# Patient Record
Sex: MALE | Race: WHITE | NOT HISPANIC OR LATINO | Employment: FULL TIME | URBAN - METROPOLITAN AREA
[De-identification: names, ages, dates, MRNs, and addresses within clinical notes are randomized per-mention and may not be internally consistent; named-entity substitution may affect disease eponyms.]

---

## 2019-04-02 ENCOUNTER — OFFICE VISIT (OUTPATIENT)
Dept: URGENT CARE | Facility: CLINIC | Age: 29
End: 2019-04-02

## 2019-04-02 VITALS
SYSTOLIC BLOOD PRESSURE: 121 MMHG | WEIGHT: 185 LBS | RESPIRATION RATE: 16 BRPM | BODY MASS INDEX: 25.9 KG/M2 | HEART RATE: 73 BPM | TEMPERATURE: 99 F | HEIGHT: 71 IN | DIASTOLIC BLOOD PRESSURE: 65 MMHG | OXYGEN SATURATION: 97 %

## 2019-04-02 DIAGNOSIS — J32.9 SINUSITIS, UNSPECIFIED CHRONICITY, UNSPECIFIED LOCATION: Primary | ICD-10-CM

## 2019-04-02 LAB
CTP QC/QA: YES
S PYO RRNA THROAT QL PROBE: NEGATIVE

## 2019-04-02 PROCEDURE — 87880 POCT RAPID STREP A: ICD-10-PCS | Mod: QW,S$GLB,, | Performed by: NURSE PRACTITIONER

## 2019-04-02 PROCEDURE — 87880 STREP A ASSAY W/OPTIC: CPT | Mod: QW,S$GLB,, | Performed by: NURSE PRACTITIONER

## 2019-04-02 PROCEDURE — 99214 OFFICE O/P EST MOD 30 MIN: CPT | Mod: S$GLB,,, | Performed by: NURSE PRACTITIONER

## 2019-04-02 PROCEDURE — 99214 PR OFFICE/OUTPT VISIT, EST, LEVL IV, 30-39 MIN: ICD-10-PCS | Mod: S$GLB,,, | Performed by: NURSE PRACTITIONER

## 2019-04-02 RX ORDER — AZITHROMYCIN 250 MG/1
TABLET, FILM COATED ORAL
Qty: 6 TABLET | Refills: 0 | Status: SHIPPED | OUTPATIENT
Start: 2019-04-02

## 2019-04-02 RX ORDER — BENZONATATE 100 MG/1
100 CAPSULE ORAL EVERY 6 HOURS PRN
Qty: 30 CAPSULE | Refills: 1 | Status: SHIPPED | OUTPATIENT
Start: 2019-04-02 | End: 2020-04-01

## 2019-04-02 NOTE — PATIENT INSTRUCTIONS
"Please follow up with your Primary care provider within 2-5 days if your signs and symptoms have not resolved or worsen.  The usual course of cold symptoms are 10-14 days.     If your condition worsens or fails to improve we recommend that you receive another evaluation at the emergency room immediately or contact your primary medical clinic to discuss your concerns.     You must understand that you have received an Urgent Care treatment only and that you may be released before all of your medical problems are known or treated.   You, the patient, will arrange for follow up care as instructed.     Tylenol or Ibuprofen can also be used as directed for pain/fever unless you have an allergy to them or medical condition such as stomach ulcers, kidney or liver disease or blood thinners etc for which you should not be taking these type of medications.     Take over the counter cough medication as directed as needed for cough.  You should avoid medications with pseudoephedrine or phenylephrine (any medication with "D") if you have high blood pressure as this can cause an elevation in your blood pressure. Instead consider Corcidin HBP as needed to prevent an elevated blood pressure.     Natural remedies of symptoms (as needed) include humidification, saline nasal sprays, and/or steamy showers.  Increase fluids, warm tea with honey, cough drops as needed.  You may also use salt water gargles for sore throat.    IF you received a steroid shot today - As discussed, this can elevate your blood pressure, elevate your blood sugar, water weight gain, nervous energy, redness to the face and dimpling of the skin at the injection site.     Sinusitis (Antibiotic Treatment)    The sinuses are air-filled spaces within the bones of the face. They connect to the inside of the nose. Sinusitis is an inflammation of the tissue lining the sinus cavity. Sinus inflammation can occur during a cold. It can also be due to allergies to pollens and " other particles in the air. Sinusitis can cause symptoms of sinus congestion and fullness. A sinus infection causes fever, headache and facial pain. There is often green or yellow drainage from the nose or into the back of the throat (post-nasal drip). You have been given antibiotics to treat this condition.  Home care:  · Take the full course of antibiotics as instructed. Do not stop taking them, even if you feel better.  · Drink plenty of water, hot tea, and other liquids. This may help thin mucus. It also may promote sinus drainage.  · Heat may help soothe painful areas of the face. Use a towel soaked in hot water. Or,  the shower and direct the hot spray onto your face. Using a vaporizer along with a menthol rub at night may also help.   · An expectorant containing guaifenesin may help thin the mucus and promote drainage from the sinuses.  · Over-the-counter decongestants may be used unless a similar medicine was prescribed. Nasal sprays work the fastest. Use one that contains phenylephrine or oxymetazoline. First blow the nose gently. Then use the spray. Do not use these medicines more often than directed on the label or symptoms may get worse. You may also use tablets containing pseudoephedrine. Avoid products that combine ingredients, because side effects may be increased. Read labels. You can also ask the pharmacist for help. (NOTE: Persons with high blood pressure should not use decongestants. They can raise blood pressure.)  · Over-the-counter antihistamines may help if allergies contributed to your sinusitis.    · Do not use nasal rinses or irrigation during an acute sinus infection, unless told to by your health care provider. Rinsing may spread the infection to other sinuses.  · Use acetaminophen or ibuprofen to control pain, unless another pain medicine was prescribed. (If you have chronic liver or kidney disease or ever had a stomach ulcer, talk with your doctor before using these medicines.  Aspirin should never be used in anyone under 18 years of age who is ill with a fever. It may cause severe liver damage.)  · Don't smoke. This can worsen symptoms.  Follow-up care  Follow up with your healthcare provider or our staff if you are not improving within the next week.  When to seek medical advice  Call your healthcare provider if any of these occur:  · Facial pain or headache becoming more severe  · Stiff neck  · Unusual drowsiness or confusion  · Swelling of the forehead or eyelids  · Vision problems, including blurred or double vision  · Fever of 100.4ºF (38ºC) or higher, or as directed by your healthcare provider  · Seizure  · Breathing problems  · Symptoms not resolving within 10 days  Date Last Reviewed: 4/13/2015  © 6491-6977 [employer]. [address] [place] [address], [place] [address]. All rights reserved. This information is not intended as a substitute for professional medical care. Always follow your healthcare professional's instructions.

## 2019-04-02 NOTE — PROGRESS NOTES
"azithSubjective:       Patient ID: Beck De La Cruz is a 28 y.o. male.    Vitals:  height is 5' 11" (1.803 m) and weight is 83.9 kg (185 lb). His oral temperature is 98.7 °F (37.1 °C). His blood pressure is 121/65 and his pulse is 73. His respiration is 16 and oxygen saturation is 97%.     Chief Complaint: Cough    Cough   This is a new problem. Episode onset: 4 days ago. The problem has been gradually worsening. The problem occurs constantly. The cough is productive of sputum. Associated symptoms include headaches, postnasal drip and shortness of breath. Pertinent negatives include no chest pain, chills, ear congestion, ear pain, eye redness, fever, heartburn, hemoptysis, myalgias, nasal congestion, rash, rhinorrhea, sore throat, sweats or wheezing. Nothing aggravates the symptoms. Treatments tried: mucinex, lana seltzer severe. The treatment provided mild relief. There is no history of asthma, bronchiectasis, bronchitis, COPD, emphysema or pneumonia.       Constitution: Negative for chills, sweating, fatigue and fever.   HENT: Positive for postnasal drip. Negative for ear pain, congestion, sinus pain, sinus pressure, sore throat and voice change.    Neck: Negative for painful lymph nodes.   Cardiovascular: Negative for chest pain.   Eyes: Negative for eye redness.   Respiratory: Positive for cough, sputum production and shortness of breath. Negative for chest tightness, bloody sputum, COPD, stridor, wheezing and asthma.    Gastrointestinal: Negative for nausea, vomiting and heartburn.   Musculoskeletal: Negative for muscle ache.   Skin: Negative for rash.   Allergic/Immunologic: Negative for seasonal allergies and asthma.   Neurological: Positive for headaches.   Hematologic/Lymphatic: Negative for swollen lymph nodes.       Objective:      Physical Exam   Constitutional: He is oriented to person, place, and time. He appears well-developed and well-nourished. He is cooperative.  Non-toxic appearance. He does not appear " ill. No distress.   HENT:   Head: Normocephalic and atraumatic.   Right Ear: Hearing, tympanic membrane, external ear and ear canal normal.   Left Ear: Hearing, tympanic membrane, external ear and ear canal normal.   Nose: Nose normal. No mucosal edema, rhinorrhea or nasal deformity. No epistaxis. Right sinus exhibits no maxillary sinus tenderness and no frontal sinus tenderness. Left sinus exhibits no maxillary sinus tenderness and no frontal sinus tenderness.   Mouth/Throat: Uvula is midline, oropharynx is clear and moist and mucous membranes are normal. No trismus in the jaw. Normal dentition. No uvula swelling. No posterior oropharyngeal erythema.   Eyes: Conjunctivae and lids are normal. No scleral icterus.   Sclera clear bilat   Neck: Trachea normal, full passive range of motion without pain and phonation normal. Neck supple.   Cardiovascular: Normal rate, regular rhythm, normal heart sounds, intact distal pulses and normal pulses.   Pulmonary/Chest: Effort normal and breath sounds normal. No respiratory distress.   Abdominal: Soft. Normal appearance and bowel sounds are normal. He exhibits no distension. There is no tenderness.   Musculoskeletal: Normal range of motion. He exhibits no edema or deformity.   Neurological: He is alert and oriented to person, place, and time. He exhibits normal muscle tone. Coordination normal.   Skin: Skin is warm, dry and intact. He is not diaphoretic. No pallor.   Psychiatric: He has a normal mood and affect. His speech is normal and behavior is normal. Judgment and thought content normal. Cognition and memory are normal.   Nursing note and vitals reviewed.      Assessment:       1. Sinusitis, unspecified chronicity, unspecified location        Plan:         Sinusitis, unspecified chronicity, unspecified location  -     POCT rapid strep A  -     azithromycin (ZITHROMAX Z-CONNOR) 250 MG tablet; Take 2 tablets (500 mg) on  Day 1,  followed by 1 tablet (250 mg) once daily on Days 2  "through 5.  Dispense: 6 tablet; Refill: 0  -     benzonatate (TESSALON PERLES) 100 MG capsule; Take 1 capsule (100 mg total) by mouth every 6 (six) hours as needed for Cough.  Dispense: 30 capsule; Refill: 1      Patient Instructions   Please follow up with your Primary care provider within 2-5 days if your signs and symptoms have not resolved or worsen.  The usual course of cold symptoms are 10-14 days.     If your condition worsens or fails to improve we recommend that you receive another evaluation at the emergency room immediately or contact your primary medical clinic to discuss your concerns.     You must understand that you have received an Urgent Care treatment only and that you may be released before all of your medical problems are known or treated.   You, the patient, will arrange for follow up care as instructed.     Tylenol or Ibuprofen can also be used as directed for pain/fever unless you have an allergy to them or medical condition such as stomach ulcers, kidney or liver disease or blood thinners etc for which you should not be taking these type of medications.     Take over the counter cough medication as directed as needed for cough.  You should avoid medications with pseudoephedrine or phenylephrine (any medication with "D") if you have high blood pressure as this can cause an elevation in your blood pressure. Instead consider Corcidin HBP as needed to prevent an elevated blood pressure.     Natural remedies of symptoms (as needed) include humidification, saline nasal sprays, and/or steamy showers.  Increase fluids, warm tea with honey, cough drops as needed.  You may also use salt water gargles for sore throat.    IF you received a steroid shot today - As discussed, this can elevate your blood pressure, elevate your blood sugar, water weight gain, nervous energy, redness to the face and dimpling of the skin at the injection site.     Sinusitis (Antibiotic Treatment)    The sinuses are air-filled " spaces within the bones of the face. They connect to the inside of the nose. Sinusitis is an inflammation of the tissue lining the sinus cavity. Sinus inflammation can occur during a cold. It can also be due to allergies to pollens and other particles in the air. Sinusitis can cause symptoms of sinus congestion and fullness. A sinus infection causes fever, headache and facial pain. There is often green or yellow drainage from the nose or into the back of the throat (post-nasal drip). You have been given antibiotics to treat this condition.  Home care:  · Take the full course of antibiotics as instructed. Do not stop taking them, even if you feel better.  · Drink plenty of water, hot tea, and other liquids. This may help thin mucus. It also may promote sinus drainage.  · Heat may help soothe painful areas of the face. Use a towel soaked in hot water. Or,  the shower and direct the hot spray onto your face. Using a vaporizer along with a menthol rub at night may also help.   · An expectorant containing guaifenesin may help thin the mucus and promote drainage from the sinuses.  · Over-the-counter decongestants may be used unless a similar medicine was prescribed. Nasal sprays work the fastest. Use one that contains phenylephrine or oxymetazoline. First blow the nose gently. Then use the spray. Do not use these medicines more often than directed on the label or symptoms may get worse. You may also use tablets containing pseudoephedrine. Avoid products that combine ingredients, because side effects may be increased. Read labels. You can also ask the pharmacist for help. (NOTE: Persons with high blood pressure should not use decongestants. They can raise blood pressure.)  · Over-the-counter antihistamines may help if allergies contributed to your sinusitis.    · Do not use nasal rinses or irrigation during an acute sinus infection, unless told to by your health care provider. Rinsing may spread the infection to  other sinuses.  · Use acetaminophen or ibuprofen to control pain, unless another pain medicine was prescribed. (If you have chronic liver or kidney disease or ever had a stomach ulcer, talk with your doctor before using these medicines. Aspirin should never be used in anyone under 18 years of age who is ill with a fever. It may cause severe liver damage.)  · Don't smoke. This can worsen symptoms.  Follow-up care  Follow up with your healthcare provider or our staff if you are not improving within the next week.  When to seek medical advice  Call your healthcare provider if any of these occur:  · Facial pain or headache becoming more severe  · Stiff neck  · Unusual drowsiness or confusion  · Swelling of the forehead or eyelids  · Vision problems, including blurred or double vision  · Fever of 100.4ºF (38ºC) or higher, or as directed by your healthcare provider  · Seizure  · Breathing problems  · Symptoms not resolving within 10 days  Date Last Reviewed: 4/13/2015  © 5993-5062 [employer], [employer]. [address] [place] [address], [place] [address]. All rights reserved. This information is not intended as a substitute for professional medical care. Always follow your healthcare professional's instructions.

## 2025-04-19 ENCOUNTER — HOSPITAL ENCOUNTER (EMERGENCY)
Facility: HOSPITAL | Age: 35
Discharge: HOME | End: 2025-04-19
Attending: STUDENT IN AN ORGANIZED HEALTH CARE EDUCATION/TRAINING PROGRAM
Payer: COMMERCIAL

## 2025-04-19 VITALS
HEIGHT: 70 IN | BODY MASS INDEX: 27.92 KG/M2 | SYSTOLIC BLOOD PRESSURE: 122 MMHG | RESPIRATION RATE: 20 BRPM | OXYGEN SATURATION: 98 % | DIASTOLIC BLOOD PRESSURE: 82 MMHG | TEMPERATURE: 98 F | WEIGHT: 195 LBS | HEART RATE: 70 BPM

## 2025-04-19 DIAGNOSIS — M54.12 CERVICAL RADICULOPATHY: ICD-10-CM

## 2025-04-19 DIAGNOSIS — M54.2 NECK PAIN: Primary | ICD-10-CM

## 2025-04-19 PROCEDURE — 25000003 PHARM REV CODE 250: Performed by: PHYSICIAN ASSISTANT

## 2025-04-19 PROCEDURE — 99284 EMERGENCY DEPT VISIT MOD MDM: CPT | Mod: 25

## 2025-04-19 PROCEDURE — 63600175 PHARM REV CODE 636 W HCPCS: Performed by: PHYSICIAN ASSISTANT

## 2025-04-19 PROCEDURE — 96372 THER/PROPH/DIAG INJ SC/IM: CPT | Performed by: PHYSICIAN ASSISTANT

## 2025-04-19 RX ORDER — GABAPENTIN 300 MG/1
300 CAPSULE ORAL NIGHTLY PRN
Qty: 15 CAPSULE | Refills: 0 | Status: SHIPPED | OUTPATIENT
Start: 2025-04-19 | End: 2025-04-19

## 2025-04-19 RX ORDER — METHYLPREDNISOLONE 4 MG/1
TABLET ORAL
Qty: 21 EACH | Refills: 0 | Status: SHIPPED | OUTPATIENT
Start: 2025-04-19 | End: 2025-05-10

## 2025-04-19 RX ORDER — IBUPROFEN 600 MG/1
600 TABLET ORAL
Status: COMPLETED | OUTPATIENT
Start: 2025-04-19 | End: 2025-04-19

## 2025-04-19 RX ORDER — METHOCARBAMOL 750 MG/1
1500 TABLET, FILM COATED ORAL 3 TIMES DAILY PRN
Qty: 30 TABLET | Refills: 0 | Status: SHIPPED | OUTPATIENT
Start: 2025-04-19 | End: 2025-04-19

## 2025-04-19 RX ORDER — GABAPENTIN 300 MG/1
300 CAPSULE ORAL NIGHTLY PRN
Qty: 15 CAPSULE | Refills: 0 | Status: SHIPPED | OUTPATIENT
Start: 2025-04-19 | End: 2025-04-22

## 2025-04-19 RX ORDER — LIDOCAINE 50 MG/G
1 PATCH TOPICAL
Status: DISCONTINUED | OUTPATIENT
Start: 2025-04-19 | End: 2025-04-19 | Stop reason: HOSPADM

## 2025-04-19 RX ORDER — METHOCARBAMOL 750 MG/1
1500 TABLET, FILM COATED ORAL 3 TIMES DAILY PRN
Qty: 30 TABLET | Refills: 0 | Status: SHIPPED | OUTPATIENT
Start: 2025-04-19 | End: 2025-04-22 | Stop reason: SDUPTHER

## 2025-04-19 RX ORDER — DEXAMETHASONE SODIUM PHOSPHATE 4 MG/ML
12 INJECTION, SOLUTION INTRA-ARTICULAR; INTRALESIONAL; INTRAMUSCULAR; INTRAVENOUS; SOFT TISSUE
Status: COMPLETED | OUTPATIENT
Start: 2025-04-19 | End: 2025-04-19

## 2025-04-19 RX ORDER — METHYLPREDNISOLONE 4 MG/1
TABLET ORAL
Qty: 21 EACH | Refills: 0 | Status: SHIPPED | OUTPATIENT
Start: 2025-04-19 | End: 2025-04-19

## 2025-04-19 RX ADMIN — LIDOCAINE 1 PATCH: 50 PATCH TOPICAL at 05:04

## 2025-04-19 RX ADMIN — DEXAMETHASONE SODIUM PHOSPHATE 12 MG: 4 INJECTION INTRA-ARTICULAR; INTRALESIONAL; INTRAMUSCULAR; INTRAVENOUS; SOFT TISSUE at 05:04

## 2025-04-19 RX ADMIN — IBUPROFEN 600 MG: 600 TABLET, FILM COATED ORAL at 05:04

## 2025-04-19 NOTE — DISCHARGE INSTRUCTIONS
Ibuprofen, Tylenol as needed for pain.  Robaxin for continued stiffness/soreness. Be aware, this medication is sedating.  Do not mix with alcohol or any other sedating medications.  Do not drive or operate machinery when taking this medication.  Take the Medrol Dosepak as written on packaging.  We may also try gabapentin in the evenings, especially if any difficulty sleeping.  This medication is also sedating, do not take with Robaxin.  Do not drive or operate machinery with this medication.  Over-the-counter lidocaine patches or menthol patches may also prove beneficial.    Establish care with a local primary care provider, establish care with a local neurosurgeon for repeat exam and re-evaluation.    Return to this ED if you develop more severe pain despite current plan, if you begin with fever and headache, if you develop hand weakness, if any other problems occur.
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
## 2025-04-21 ENCOUNTER — OFFICE VISIT (OUTPATIENT)
Dept: NEUROSURGERY | Facility: CLINIC | Age: 35
End: 2025-04-21
Payer: COMMERCIAL

## 2025-04-21 VITALS
WEIGHT: 195.13 LBS | DIASTOLIC BLOOD PRESSURE: 79 MMHG | HEART RATE: 49 BPM | HEIGHT: 70 IN | BODY MASS INDEX: 27.94 KG/M2 | RESPIRATION RATE: 18 BRPM | SYSTOLIC BLOOD PRESSURE: 128 MMHG

## 2025-04-21 DIAGNOSIS — M54.2 NECK PAIN: ICD-10-CM

## 2025-04-21 DIAGNOSIS — M54.12 CERVICAL RADICULOPATHY: Primary | ICD-10-CM

## 2025-04-21 PROCEDURE — 3074F SYST BP LT 130 MM HG: CPT | Mod: CPTII,S$GLB,, | Performed by: PHYSICIAN ASSISTANT

## 2025-04-21 PROCEDURE — 3078F DIAST BP <80 MM HG: CPT | Mod: CPTII,S$GLB,, | Performed by: PHYSICIAN ASSISTANT

## 2025-04-21 PROCEDURE — 1159F MED LIST DOCD IN RCRD: CPT | Mod: CPTII,S$GLB,, | Performed by: PHYSICIAN ASSISTANT

## 2025-04-21 PROCEDURE — 99203 OFFICE O/P NEW LOW 30 MIN: CPT | Mod: S$GLB,,, | Performed by: PHYSICIAN ASSISTANT

## 2025-04-21 PROCEDURE — 3008F BODY MASS INDEX DOCD: CPT | Mod: CPTII,S$GLB,, | Performed by: PHYSICIAN ASSISTANT

## 2025-04-21 NOTE — PROGRESS NOTES
"Neurosurgery History and Physical    Patient ID: Beck De La Cruz is a 34 y.o. male.    Chief Complaint   Patient presents with    Neck Pain     Left sided cervical radiculopathy x 1-2 mos.  No report of any injury.       HPI 4/21/25:  Patient is a 34 year old RHD male with no pertinent medical history who presents to clinic for evaluation of left arm pain. He has had arm and neck pain for about 2 months now, but 3 days ago it became significantly worse prompting an ER visit. There was no inciting event, accident or injury. He used to be able to find a comfortable position with left arm above his head, but now he cannot find any position without pain. The pain radiates from his neck to the posterior left elbow and he has numbness and tingling into the first three fingers of the left hand. He hasn't dropped anything, but feels like the left arm can't hold weight. He went to the chiropractor this morning and his discomfort is worse, but states that he did not have his neck adjusted. At the ER he was given a medrol dosepak, robaxin, and gabapentin. He is really only taking ibuprofen, advil, and the muscle relaxer without relief. He isn't sure if he's been taking the gabapentin, but doesn't believe so. He is taking the medrol dosepak without relief.     Review of Systems   Musculoskeletal:  Positive for neck pain. Negative for gait problem.   Neurological:  Positive for weakness and numbness.       History reviewed. No pertinent past medical history.  Social History[1]  Family History   Problem Relation Name Age of Onset    No Known Problems Mother      No Known Problems Father       Review of patient's allergies indicates:   Allergen Reactions    Sulfa (sulfonamide antibiotics)      Current Medications[2]  Blood pressure 128/79, pulse (!) 49, resp. rate 18, height 5' 10" (1.778 m), weight 88.5 kg (195 lb 1.7 oz).      Neurological Exam  Mental Status  Awake, alert and oriented to person, place and time.    Cranial Nerves  CN " VII:  Right: There is no facial weakness.  Left: There is no facial weakness.    Motor                                               Right                     Left  Deltoid                                   5                            Biceps                                   5                          4   Triceps                                  5                          4-   Wrist flexor                            5                          4-   Wrist extensor                       5                          5   Interossei                              5                          4   Iliopsoas                               5                          5   Quadriceps                           5                          5  Ankle dorsiflexor                   5                          5  Ankle plantar flexor              5                           5  Extensor hallucis longus      5                           5    Sensory  Left: Loss of sensation in the C7 and C6 dermatome.    Reflexes                                            Right                      Left  Biceps                                 2+                         2+  Triceps                                2+                         2+  Patellar                                2+                         2+  Achilles                                2+                         2+    Right pathological reflexes: Eduardo's absent. Ankle clonus absent.  Left pathological reflexes: Eduardo's present. Ankle clonus absent.    Gait  Casual gait is normal including stance, stride, and arm swing.      Physical Exam  Vitals and nursing note reviewed.   Neurological:      Deep Tendon Reflexes:      Reflex Scores:       Tricep reflexes are 2+ on the right side and 2+ on the left side.       Bicep reflexes are 2+ on the right side and 2+ on the left side.       Patellar reflexes are 2+ on the right side and 2+ on the left side.       Achilles reflexes are 2+ on the right  "side and 2+ on the left side.        Vital Signs  Pulse: (!) 49  Resp: 18  BP: 128/79  BP Location: Right arm  Patient Position: Sitting  Pain Score:   6  Pain Loc: Neck  Height and Weight  Height: 5' 10" (177.8 cm)  Weight: 88.5 kg (195 lb 1.7 oz)  BSA (Calculated - sq m): 2.09 sq meters  BMI (Calculated): 28  Weight in (lb) to have BMI = 25: 173.9]    Provider dictation:  No imaging for review. Patient has significant weakness and a left [place]. Will send him for urgent imaging and follow up with Dr. Carter. Discussed better gabapentin regimen, recommended 300mg tid and 600mg at night if needed. He may continue alternating his tylenol and advil as needed. All questions were answered.     Visit Diagnosis:  Neck pain  -     Ambulatory referral/consult to Neurosurgery    Cervical radiculopathy  -     Ambulatory referral/consult to Neurosurgery             [1]   Social History  Socioeconomic History    Marital status:    Tobacco Use    Smoking status: Former    Smokeless tobacco: Current   Substance and Sexual Activity    Alcohol use: Yes    Drug use: Never   [2]   Current Outpatient Medications:     gabapentin (NEURONTIN) 300 MG capsule, Take 1 capsule (300 mg total) by mouth nightly as needed (neck pain/arm pain)., Disp: 15 capsule, Rfl: 0    methocarbamoL (ROBAXIN) 750 MG Tab, Take 2 tablets (1,500 mg total) by mouth 3 (three) times daily as needed (stiffness/soreness)., Disp: 30 tablet, Rfl: 0    methylPREDNISolone (MEDROL DOSEPACK) 4 mg tablet, use as directed, Disp: 21 each, Rfl: 0    azithromycin (ZITHROMAX Z-CONNOR) 250 MG tablet, Take 2 tablets (500 mg) on  Day 1,  followed by 1 tablet (250 mg) once daily on Days 2 through 5. (Patient not taking: Reported on 4/21/2025), Disp: 6 tablet, Rfl: 0    "

## 2025-04-22 ENCOUNTER — TELEPHONE (OUTPATIENT)
Dept: NEUROSURGERY | Facility: CLINIC | Age: 35
End: 2025-04-22
Payer: COMMERCIAL

## 2025-04-22 ENCOUNTER — OFFICE VISIT (OUTPATIENT)
Dept: NEUROSURGERY | Facility: CLINIC | Age: 35
End: 2025-04-22
Payer: COMMERCIAL

## 2025-04-22 VITALS
RESPIRATION RATE: 18 BRPM | SYSTOLIC BLOOD PRESSURE: 122 MMHG | DIASTOLIC BLOOD PRESSURE: 74 MMHG | WEIGHT: 195.13 LBS | HEART RATE: 66 BPM | HEIGHT: 70 IN | BODY MASS INDEX: 27.94 KG/M2

## 2025-04-22 DIAGNOSIS — M54.12 CERVICAL RADICULOPATHY: Primary | ICD-10-CM

## 2025-04-22 DIAGNOSIS — G95.9 CERVICAL MYELOPATHY: ICD-10-CM

## 2025-04-22 PROCEDURE — 1159F MED LIST DOCD IN RCRD: CPT | Mod: CPTII,S$GLB,, | Performed by: NEUROLOGICAL SURGERY

## 2025-04-22 PROCEDURE — 3074F SYST BP LT 130 MM HG: CPT | Mod: CPTII,S$GLB,, | Performed by: NEUROLOGICAL SURGERY

## 2025-04-22 PROCEDURE — 99215 OFFICE O/P EST HI 40 MIN: CPT | Mod: S$GLB,,, | Performed by: NEUROLOGICAL SURGERY

## 2025-04-22 PROCEDURE — 3008F BODY MASS INDEX DOCD: CPT | Mod: CPTII,S$GLB,, | Performed by: NEUROLOGICAL SURGERY

## 2025-04-22 PROCEDURE — 3078F DIAST BP <80 MM HG: CPT | Mod: CPTII,S$GLB,, | Performed by: NEUROLOGICAL SURGERY

## 2025-04-22 RX ORDER — LIDOCAINE HYDROCHLORIDE 10 MG/ML
1 INJECTION, SOLUTION EPIDURAL; INFILTRATION; INTRACAUDAL; PERINEURAL ONCE
OUTPATIENT
Start: 2025-04-22 | End: 2025-04-22

## 2025-04-22 RX ORDER — METHOCARBAMOL 750 MG/1
1500 TABLET, FILM COATED ORAL 3 TIMES DAILY PRN
Qty: 30 TABLET | Refills: 0 | Status: SHIPPED | OUTPATIENT
Start: 2025-04-22 | End: 2025-04-27

## 2025-04-22 RX ORDER — GABAPENTIN 300 MG/1
300 CAPSULE ORAL 3 TIMES DAILY
Qty: 90 CAPSULE | Refills: 2 | Status: SHIPPED | OUTPATIENT
Start: 2025-04-22 | End: 2025-07-21

## 2025-04-22 RX ORDER — CEFAZOLIN SODIUM 2 G/50ML
2 SOLUTION INTRAVENOUS
OUTPATIENT
Start: 2025-04-22

## 2025-04-22 RX ORDER — SODIUM CHLORIDE, SODIUM LACTATE, POTASSIUM CHLORIDE, CALCIUM CHLORIDE 600; 310; 30; 20 MG/100ML; MG/100ML; MG/100ML; MG/100ML
INJECTION, SOLUTION INTRAVENOUS CONTINUOUS
OUTPATIENT
Start: 2025-04-22

## 2025-04-22 NOTE — PROGRESS NOTES
"Neurosurgery History and Physical    Patient ID: Beck De La Cruz is a 34 y.o. male.    Chief Complaint   Patient presents with    Follow-up     F/U with imaging       HPI 4/21/25:  Patient is a 34 year old RHD male with no pertinent medical history who presents to clinic for evaluation of left arm pain. He has had arm and neck pain for about 2 months now, but 3 days ago it became significantly worse prompting an ER visit. There was no inciting event, accident or injury. He used to be able to find a comfortable position with left arm above his head, but now he cannot find any position without pain. The pain radiates from his neck to the posterior left elbow and he has numbness and tingling into the first three fingers of the left hand. He hasn't dropped anything, but feels like the left arm can't hold weight. He went to the chiropractor this morning and his discomfort is worse, but states that he did not have his neck adjusted. At the ER he was given a medrol dosepak, robaxin, and gabapentin. He is really only taking ibuprofen, advil, and the muscle relaxer without relief. He isn't sure if he's been taking the gabapentin, but doesn't believe so. He is taking the medrol dosepak without relief.     Review of Systems   Musculoskeletal:  Positive for neck pain. Negative for gait problem.   Neurological:  Positive for weakness and numbness.       History reviewed. No pertinent past medical history.  Social History[1]  Family History   Problem Relation Name Age of Onset    No Known Problems Mother      No Known Problems Father       Review of patient's allergies indicates:   Allergen Reactions    Sulfa (sulfonamide antibiotics)      Current Medications[2]  Blood pressure 122/74, pulse 66, resp. rate 18, height 5' 10" (1.778 m), weight 88.5 kg (195 lb 1.7 oz).      Neurological Exam  Mental Status  Awake, alert and oriented to person, place and time.    Cranial Nerves  CN VII:  Right: There is no facial weakness.  Left: There is " no facial weakness.    Motor                                               Right                     Left  Deltoid                                   5                          5   Biceps                                   5                          4+   Triceps                                  5                          3   Wrist flexor                            5                          4-   Wrist extensor                       5                          5   Interossei                              5                          4   Iliopsoas                               5                          5   Quadriceps                           5                          5  Ankle dorsiflexor                   5                          5  Ankle plantar flexor              5                           5  Extensor hallucis longus      5                           5    Sensory  Left: Loss of sensation in the C7 dermatome.    Reflexes                                            Right                      Left  Biceps                                 2+                         2+  Triceps                                2+                         0  Patellar                                2+                         2+  Achilles                                2+                         2+  Right Plantar: downgoing  Left Plantar: downgoing    Right pathological reflexes: Eduardo's absent. Ankle clonus present. 2 beats.  Left pathological reflexes: Eduardo's present. Ankle clonus present. 4 beats.    Gait  Casual gait is normal including stance, stride, and arm swing.      Physical Exam  Vitals and nursing note reviewed.   Neurological:      Deep Tendon Reflexes:      Reflex Scores:       Tricep reflexes are 2+ on the right side and 0 on the left side.       Bicep reflexes are 2+ on the right side and 2+ on the left side.       Patellar reflexes are 2+ on the right side and 2+ on the left side.       Achilles reflexes are 2+ on the  "right side and 2+ on the left side.      Vital Signs  Pulse: 66  Resp: 18  BP: 122/74  BP Location: Right arm  Patient Position: Sitting  Pain Score:   8  Pain Loc: Shoulder  Height and Weight  Height: 5' 10" (177.8 cm)  Weight: 88.5 kg (195 lb 1.7 oz)  BSA (Calculated - sq m): 2.09 sq meters  BMI (Calculated): 28  Weight in (lb) to have BMI = 25: 173.9]    Provider dictation:  Symptoms have progressed even compared to yesterday. Now numbness also involves fourth left finger. Excruciating left C7 radicular pain and neck pain.     MRI shows large left C6-C7 extruded disc herniation contacting the spinal cord and resulting in severe left foraminal stenosis. No instability on dynamic XR.    Given his significant weakness, his left-sided long tract signs and the large compressive disc herniation on MRI, surgery is indicated. I offered him a C6-C7 disc arthroplasty using the Simplify device. I have discussed the risks, benefits and alternatives to the proposed operation in detail. All questions were answered. Risks discussed include but are not limited to: bleeding, infection, pain, scarring, swallowing dysfunction, hoarseness, spinal fluid leak, injury to the spinal cord or nerves, injury to the blood vessels, stroke, heart attack, blood clots, malpositioning or failure of hardware, heterotopic bone formation, osteolysis, adjacent level disease, no improvement or worsening of symptoms, need for more surgery, death. The patient wishes to proceed.    He will need PCP clearance. Surgery is urgent given his significant weakness.     45 minutes were spent on this encounter. This included counseling, coordinating care, discussion regarding the prognosis, differential diagnosis, risks and benefits of treatment, instructions, compliance risk reduction, review of imaging, medical records review, imaging and lab interpretation, documentation or discussion with another health care provider.    Visit Diagnosis:  Cervical " radiculopathy    Cervical myelopathy    Other orders  -     gabapentin (NEURONTIN) 300 MG capsule; Take 1 capsule (300 mg total) by mouth 3 (three) times daily.  Dispense: 90 capsule; Refill: 2  -     methocarbamoL (ROBAXIN) 750 MG Tab; Take 2 tablets (1,500 mg total) by mouth 3 (three) times daily as needed (stiffness/soreness).  Dispense: 30 tablet; Refill: 0                 [1]   Social History  Socioeconomic History    Marital status:    Tobacco Use    Smoking status: Former    Smokeless tobacco: Current   Substance and Sexual Activity    Alcohol use: Yes    Drug use: Never   [2]   Current Outpatient Medications:     azithromycin (ZITHROMAX Z-CONNOR) 250 MG tablet, Take 2 tablets (500 mg) on  Day 1,  followed by 1 tablet (250 mg) once daily on Days 2 through 5. (Patient not taking: Reported on 4/21/2025), Disp: 6 tablet, Rfl: 0    gabapentin (NEURONTIN) 300 MG capsule, Take 1 capsule (300 mg total) by mouth nightly as needed (neck pain/arm pain)., Disp: 15 capsule, Rfl: 0    methocarbamoL (ROBAXIN) 750 MG Tab, Take 2 tablets (1,500 mg total) by mouth 3 (three) times daily as needed (stiffness/soreness)., Disp: 30 tablet, Rfl: 0    methylPREDNISolone (MEDROL DOSEPACK) 4 mg tablet, use as directed, Disp: 21 each, Rfl: 0

## 2025-04-23 ENCOUNTER — TELEPHONE (OUTPATIENT)
Dept: NEUROSURGERY | Facility: CLINIC | Age: 35
End: 2025-04-23
Payer: COMMERCIAL

## 2025-04-23 DIAGNOSIS — M54.12 CERVICAL RADICULOPATHY: Primary | ICD-10-CM

## 2025-04-23 NOTE — TELEPHONE ENCOUNTER
Request for Surgical Clearance:     Mr. Beck De La Cruz will be having surgery on May 1, 2025 with Dr. Caitlyn Carter, Neurosurgeon, at [facility]. We are reaching out to obtain a surgical clearance from Dr. Brandon Gutierrez to ensure the safety of our patient. Mr. Solares is scheduled for a new patient preoperative clearance appointment on April 28, 2025.     The surgery is C6-C7 ADR and will be performed under general anesthesia. This procedure typically lasts approximately 3 hours. We request that the patient hold all anticoagulant/ anti-inflammatory medications for 5-7 days prior to surgery.     Sincerely,  Mary Ann Song LPN  P: [phone]  F: [phone]

## 2025-04-23 NOTE — TELEPHONE ENCOUNTER
Request for Surgical Clearance:    Mr. Beck De La Cruz will be having surgery on May 1, 2025 with Dr. Caitlyn Carter, Neurosurgeon, at [facility]. We are reaching out to obtain a surgical clearance from Dr. Maverick Baker to ensure the safety of our patient. Mr. Solares is scheduled for a new patient preoperative clearance appointment on tomorrow at your office.     The surgery is C6-C7 ADR and will be performed under general anesthesia. This procedure typically lasts approximately 3 hours. We request that the patient hold all anticoagulant/ anti-inflammatory medications for 5-7 days prior to surgery.    Sincerely,  Mary Ann Song LPN  P: [phone]  F: [phone]

## 2025-04-25 ENCOUNTER — TELEPHONE (OUTPATIENT)
Dept: NEUROSURGERY | Facility: CLINIC | Age: 35
End: 2025-04-25
Payer: COMMERCIAL

## 2025-04-25 NOTE — TELEPHONE ENCOUNTER
Spoke with pt and informed him that his [facility] papers have been submitted to his employer as of this morning via fax.

## 2025-04-29 ENCOUNTER — TELEPHONE (OUTPATIENT)
Dept: NEUROSURGERY | Facility: CLINIC | Age: 35
End: 2025-04-29
Payer: COMMERCIAL

## 2025-04-29 NOTE — TELEPHONE ENCOUNTER
----- Message from Isadora sent at 4/29/2025 12:15 PM CDT -----  Regarding: Needs Medical Advice  Contact: patient at [phone]  Type: Needs Medical AdviceWho Called:  patient at [phone] Information: checking to see if authorization has been done by insurance and other details for appointment on 05/01 including results from labs. Please call and advise. Thank you  ----- Message -----  From: Isadora Paulson  Sent: 4/29/2025  12:16 PM CDT  To: Raul RICHARD Staff  Subject: Needs Medical Advice                             Type: Needs Medical AdviceWho Called:  patient at [phone] Information: checking to see if authorization has been done by insurance and other details for appointment on 05/01. Please call and advise. Thank you

## 2025-04-30 ENCOUNTER — TELEPHONE (OUTPATIENT)
Dept: NEUROSURGERY | Facility: CLINIC | Age: 35
End: 2025-04-30
Payer: COMMERCIAL

## 2025-04-30 NOTE — TELEPHONE ENCOUNTER
Spoke with patient, reporting new additional numbness and persistent severe weakness even since our office visit last week. Very concerned about his progression rapidly worsening. Patient encouraged to present to the ER to consider initiation of steroids prior to surgery to attempt to slow progression of nerve injury. Patient voiced understanding.

## 2025-05-01 PROBLEM — Z98.890 S/P CERVICAL DISC REPLACEMENT: Status: ACTIVE | Noted: 2025-05-01

## 2025-05-02 ENCOUNTER — TELEPHONE (OUTPATIENT)
Dept: NEUROSURGERY | Facility: CLINIC | Age: 35
End: 2025-05-02
Payer: COMMERCIAL

## 2025-05-02 NOTE — TELEPHONE ENCOUNTER
Mr. Solares is asking if his wound check can be done by Mychart msg pics. He says he has limited transportation and he lives almost 3hrs away.

## 2025-05-02 NOTE — TELEPHONE ENCOUNTER
----- Message from Nicole Simms PA-C sent at 5/2/2025 12:45 PM CDT -----  Regarding: wound check  Patient is requesting wound check to be virtual, will send photo/video of wound. Transportation difficulty

## 2025-05-02 NOTE — TELEPHONE ENCOUNTER
Pt informed that he is able to do photos instead of coming to clinic. Pt asked to submit clear pictures of the incision via Mychart on the day he is scheduled for wound check. Pt verbalized understanding.

## 2025-05-05 ENCOUNTER — TELEPHONE (OUTPATIENT)
Dept: NEUROSURGERY | Facility: CLINIC | Age: 35
End: 2025-05-05
Payer: COMMERCIAL

## 2025-05-05 NOTE — TELEPHONE ENCOUNTER
----- Message from Isadora sent at 5/5/2025  3:22 PM CDT -----  Regarding: Needs Medical Advice  Contact: rowdy from [employer] at [phone]  Type: Needs Medical AdviceWho Called:  rowdy from [employer] at [phone] Information: needs CPT code from procedure. claim #[id number] Please call and advise. Thank you

## 2025-05-15 ENCOUNTER — PATIENT MESSAGE (OUTPATIENT)
Dept: NEUROSURGERY | Facility: CLINIC | Age: 35
End: 2025-05-15
Payer: COMMERCIAL

## 2025-05-15 ENCOUNTER — TELEPHONE (OUTPATIENT)
Dept: NEUROSURGERY | Facility: CLINIC | Age: 35
End: 2025-05-15
Payer: COMMERCIAL

## 2025-05-15 NOTE — TELEPHONE ENCOUNTER
----- Message from Indy sent at 5/15/2025  1:56 PM CDT -----  Contact: PT  Type:  SUAD NURSE  Apoointment RequestName of Caller:PT When is the first available appointment?TOMORROW 05/16/25Symptoms:NURSE VISIT Would the patient rather a call back or a response via [facility]? CALL Best Call Back Number: [phone] Information: PT NEEDS TO EITHER SWITCH HIS APPT TO 11A TOMORROW OR TO A VIRTUAL VISITTHANK YOU

## 2025-05-16 ENCOUNTER — CLINICAL SUPPORT (OUTPATIENT)
Dept: NEUROSURGERY | Facility: CLINIC | Age: 35
End: 2025-05-16
Payer: COMMERCIAL

## 2025-05-16 DIAGNOSIS — Z98.890 S/P CERVICAL DISC REPLACEMENT: Primary | ICD-10-CM

## 2025-05-16 NOTE — PROGRESS NOTES
Pt is 15 days s/p Disc Replacement with Dr. Carter. No s/s of infection. Incision cleaned with chloraprep and steristrips removed with no complication. Incision is warm, dry, intact. Pt reports post-operative pain level less than pre-operative state. Pt is not requesting medication refill today. Pt re-educated on narcotics policy. Educated patient on weight lifting status, bending/lifting/twisting, and to call with any changes or questions. Pt aware of imaging and f/u appt with provider. No further questions.

## 2025-06-10 ENCOUNTER — OFFICE VISIT (OUTPATIENT)
Dept: NEUROSURGERY | Facility: CLINIC | Age: 35
End: 2025-06-10
Payer: COMMERCIAL

## 2025-06-10 VITALS
WEIGHT: 193.31 LBS | HEIGHT: 68 IN | RESPIRATION RATE: 18 BRPM | SYSTOLIC BLOOD PRESSURE: 118 MMHG | BODY MASS INDEX: 29.3 KG/M2 | DIASTOLIC BLOOD PRESSURE: 74 MMHG | HEART RATE: 73 BPM

## 2025-06-10 DIAGNOSIS — Z98.890 S/P CERVICAL DISC REPLACEMENT: Primary | ICD-10-CM

## 2025-06-10 PROCEDURE — 3078F DIAST BP <80 MM HG: CPT | Mod: CPTII,S$GLB,, | Performed by: NEUROLOGICAL SURGERY

## 2025-06-10 PROCEDURE — 99024 POSTOP FOLLOW-UP VISIT: CPT | Mod: S$GLB,,, | Performed by: NEUROLOGICAL SURGERY

## 2025-06-10 PROCEDURE — 3074F SYST BP LT 130 MM HG: CPT | Mod: CPTII,S$GLB,, | Performed by: NEUROLOGICAL SURGERY

## 2025-06-10 NOTE — PROGRESS NOTES
Neurosurgery History and Physical    Patient ID: Beck De La Cruz is a 34 y.o. male.    Chief Complaint   Patient presents with    Post-op Evaluation     6bweek post op with imaging     HPI 6/10/25:  Patient is 6 weeks status post C6-7 ADR. He has no neck pain, persistent tingling in the left hand but it is much better. He has no trouble swallowing. He did have a swelling in the center of his chest after surgery, but there was no bruising and it has resolved. He overall is doing really well. He is ready to return to work.    HPI 4/21/25:  Patient is a 34 year old RHD male with no pertinent medical history who presents to clinic for evaluation of left arm pain. He has had arm and neck pain for about 2 months now, but 3 days ago it became significantly worse prompting an ER visit. There was no inciting event, accident or injury. He used to be able to find a comfortable position with left arm above his head, but now he cannot find any position without pain. The pain radiates from his neck to the posterior left elbow and he has numbness and tingling into the first three fingers of the left hand. He hasn't dropped anything, but feels like the left arm can't hold weight. He went to the chiropractor this morning and his discomfort is worse, but states that he did not have his neck adjusted. At the ER he was given a medrol dosepak, robaxin, and gabapentin. He is really only taking ibuprofen, advil, and the muscle relaxer without relief. He isn't sure if he's been taking the gabapentin, but doesn't believe so. He is taking the medrol dosepak without relief.     Review of Systems   HENT:  Negative for sore throat, trouble swallowing and voice change.    Musculoskeletal:  Negative for gait problem and neck pain.   Neurological:  Positive for numbness. Negative for weakness.       Past Medical History:   Diagnosis Date    Bulging of cervical intervertebral disc     Scoliosis      Social History[1]  Family History   Problem Relation  "Name Age of Onset    No Known Problems Mother      No Known Problems Father       Review of patient's allergies indicates:   Allergen Reactions    Sulfa (sulfonamide antibiotics)      As a child     Current Medications[2]  Blood pressure 118/74, pulse 73, resp. rate 18, height 5' 8" (1.727 m), weight 87.7 kg (193 lb 5.5 oz).      Neurological Exam  Mental Status  Awake, alert and oriented to person, place and time.    Cranial Nerves  CN VII:  Right: There is no facial weakness.  Left: There is no facial weakness.    Motor   Normal muscle tone.                                               Right                     Left   Shoulder abduction               5                          5  Elbow flexion                         5                          5  Elbow extension                    5                          5  Wrist flexion                           5                          5  Wrist extension                      5                          5  Finger abduction                    5                          5  Hip flexion                              5                          5  Knee extension                      5                          5  Plantarflexion                         5                          5  Dorsiflexion                            5                          5    Sensory  Light touch is normal in upper and lower extremities.   Tingling left index finger.    Reflexes                                            Right                      Left  Biceps                                 2+                         2+  Triceps                                2+                         0  Patellar                                2+                         2+  Achilles                                2+                         2+  Right Plantar: downgoing  Left Plantar: downgoing    Right pathological reflexes: Eduardo's absent. Ankle clonus present. 2 beats.  Left pathological reflexes: Eduardo's absent. " "Ankle clonus present. 4 beats.    Gait  Casual gait is normal including stance, stride, and arm swing. Normal gait.      Physical Exam  Vitals and nursing note reviewed.   Neurological:      Gait: Gait is intact.      Deep Tendon Reflexes:      Reflex Scores:       Tricep reflexes are 2+ on the right side and 0 on the left side.       Bicep reflexes are 2+ on the right side and 2+ on the left side.       Patellar reflexes are 2+ on the right side and 2+ on the left side.       Achilles reflexes are 2+ on the right side and 2+ on the left side.        Vital Signs  Pulse: 73  Resp: 18  BP: 118/74  BP Location: Left arm  Patient Position: Sitting  Height and Weight  Height: 5' 8" (172.7 cm)  Weight: 87.7 kg (193 lb 5.5 oz)  BSA (Calculated - sq m): 2.05 sq meters  BMI (Calculated): 29.4  Weight in (lb) to have BMI = 25: 164.1]    Provider dictation:  XR cervical spine reviewed by Dr. Carter reveals well positioned ADR C6-7 with restoration of cervical lordosis.    Follow up as scheduled with xrays. 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     Visit Diagnosis:  S/P cervical disc replacement                     [1]   Social History  Socioeconomic History    Marital status:    Tobacco Use    Smoking status: Former    Smokeless tobacco: Former     Quit date: 11/2023   Substance and Sexual Activity    Alcohol use: Yes    Drug use: Never     Social Drivers of Health     Financial Resource Strain: Patient Declined (5/1/2025)    Overall Financial Resource Strain (CARDIA)     Difficulty of Paying Living Expenses: Patient declined   Food Insecurity: Patient Declined (5/1/2025)    Hunger Vital Sign     Worried About Running Out of Food in the Last Year: Patient declined     Ran Out of Food in the Last Year: Patient declined   Transportation Needs: Patient Declined (5/1/2025)    PRAPARE - Transportation     Lack of Transportation (Medical): Patient declined     Lack of Transportation " (Non-Medical): Patient declined   Physical Activity: Sufficiently Active (4/25/2025)    Exercise Vital Sign     Days of Exercise per Week: 7 days     Minutes of Exercise per Session: 150+ min   Stress: Patient Declined (5/1/2025)    [employer] - Occupational Stress Questionnaire     Feeling of Stress : Patient declined   Housing Stability: Patient Declined (5/1/2025)    Housing Stability Vital Sign     Unable to Pay for Housing in the Last Year: Patient declined     Number of Times Moved in the Last Year: 4     Homeless in the Last Year: Patient declined   Recent Concern: Housing Stability - High Risk (4/25/2025)    Housing Stability Vital Sign     Unable to Pay for Housing in the Last Year: No     Number of Times Moved in the Last Year: 4     Homeless in the Last Year: No   [2] No current outpatient medications on file.

## 2025-09-02 ENCOUNTER — OFFICE VISIT (OUTPATIENT)
Dept: NEUROSURGERY | Facility: CLINIC | Age: 35
End: 2025-09-02
Payer: COMMERCIAL

## 2025-09-02 VITALS
WEIGHT: 205.25 LBS | DIASTOLIC BLOOD PRESSURE: 66 MMHG | SYSTOLIC BLOOD PRESSURE: 102 MMHG | RESPIRATION RATE: 18 BRPM | HEART RATE: 62 BPM | BODY MASS INDEX: 31.11 KG/M2 | HEIGHT: 68 IN

## 2025-09-02 DIAGNOSIS — Z98.890 S/P CERVICAL DISC REPLACEMENT: Primary | ICD-10-CM

## 2025-09-02 PROCEDURE — 3008F BODY MASS INDEX DOCD: CPT | Mod: CPTII,S$GLB,, | Performed by: NEUROLOGICAL SURGERY

## 2025-09-02 PROCEDURE — 1159F MED LIST DOCD IN RCRD: CPT | Mod: CPTII,S$GLB,, | Performed by: NEUROLOGICAL SURGERY

## 2025-09-02 PROCEDURE — 3074F SYST BP LT 130 MM HG: CPT | Mod: CPTII,S$GLB,, | Performed by: NEUROLOGICAL SURGERY

## 2025-09-02 PROCEDURE — 99214 OFFICE O/P EST MOD 30 MIN: CPT | Mod: S$GLB,,, | Performed by: NEUROLOGICAL SURGERY

## 2025-09-02 PROCEDURE — 3078F DIAST BP <80 MM HG: CPT | Mod: CPTII,S$GLB,, | Performed by: NEUROLOGICAL SURGERY
